# Patient Record
Sex: MALE | URBAN - METROPOLITAN AREA
[De-identification: names, ages, dates, MRNs, and addresses within clinical notes are randomized per-mention and may not be internally consistent; named-entity substitution may affect disease eponyms.]

---

## 2021-01-01 ENCOUNTER — EMERGENCY (EMERGENCY)
Facility: HOSPITAL | Age: 36
LOS: 0 days | Discharge: HOME | End: 2021-01-01
Attending: EMERGENCY MEDICINE
Payer: SELF-PAY

## 2021-01-01 VITALS
RESPIRATION RATE: 19 BRPM | OXYGEN SATURATION: 99 % | DIASTOLIC BLOOD PRESSURE: 89 MMHG | SYSTOLIC BLOOD PRESSURE: 130 MMHG | HEART RATE: 90 BPM | TEMPERATURE: 98 F

## 2021-01-01 VITALS
DIASTOLIC BLOOD PRESSURE: 93 MMHG | OXYGEN SATURATION: 99 % | WEIGHT: 179.9 LBS | HEIGHT: 67 IN | TEMPERATURE: 98 F | HEART RATE: 93 BPM | SYSTOLIC BLOOD PRESSURE: 137 MMHG | RESPIRATION RATE: 18 BRPM

## 2021-01-01 DIAGNOSIS — R11.0 NAUSEA: ICD-10-CM

## 2021-01-01 DIAGNOSIS — K29.20 ALCOHOLIC GASTRITIS WITHOUT BLEEDING: ICD-10-CM

## 2021-01-01 DIAGNOSIS — R10.12 LEFT UPPER QUADRANT PAIN: ICD-10-CM

## 2021-01-01 DIAGNOSIS — F10.229 ALCOHOL DEPENDENCE WITH INTOXICATION, UNSPECIFIED: ICD-10-CM

## 2021-01-01 DIAGNOSIS — F10.929 ALCOHOL USE, UNSPECIFIED WITH INTOXICATION, UNSPECIFIED: ICD-10-CM

## 2021-01-01 PROCEDURE — 99283 EMERGENCY DEPT VISIT LOW MDM: CPT

## 2021-01-01 PROCEDURE — 99285 EMERGENCY DEPT VISIT HI MDM: CPT

## 2021-01-01 RX ORDER — FAMOTIDINE 10 MG/ML
40 INJECTION INTRAVENOUS ONCE
Refills: 0 | Status: COMPLETED | OUTPATIENT
Start: 2021-01-01 | End: 2021-01-01

## 2021-01-01 RX ORDER — ONDANSETRON 8 MG/1
4 TABLET, FILM COATED ORAL ONCE
Refills: 0 | Status: COMPLETED | OUTPATIENT
Start: 2021-01-01 | End: 2021-01-01

## 2021-01-01 RX ADMIN — ONDANSETRON 4 MILLIGRAM(S): 8 TABLET, FILM COATED ORAL at 15:26

## 2021-01-01 RX ADMIN — FAMOTIDINE 40 MILLIGRAM(S): 10 INJECTION INTRAVENOUS at 15:25

## 2021-01-01 NOTE — ED PROVIDER NOTE - CARE PLAN
Principal Discharge DX:	Alcohol intoxication in active alcoholic with complication  Secondary Diagnosis:	Alcoholic gastritis without bleeding, unspecified chronicity

## 2021-01-01 NOTE — ED ADULT NURSE NOTE - OBJECTIVE STATEMENT
pt biba for epigastric pain that began today. pt endorses nausea, denies vomiting. Endorses drinking alcohol today. Unable to determine how many drinks. Pt poor historian. Pw slurred speech, poor hygiene, AOB. As per MD, pt to be medicated and allow pt to sober up in ED. will ctm

## 2021-01-01 NOTE — ED PROVIDER NOTE - NSFOLLOWUPINSTRUCTIONS_ED_ALL_ED_FT
Stop abusing alcohol.      ED evaluation and management discussed with the patient and family (if available) in detail.  Close PMD follow up encouraged.  Strict ED return instructions discussed in detail and patient given the opportunity to ask any questions about their discharge diagnosis and instructions. Patient verbalized understanding.          Trastorno por consumo de bebidas alcohólicas    Alcohol Use Disorder      El trastorno por consumo de bebidas alcohólicas ocurre cuando el consumo de alcohol altera vital syd cotidiana. Las personas que padecen esta afección beben demasiado alcohol y no pueden controlar el consumo.    Aura trastorno puede causar problemas graves en la bobbi física. Puede afectar el cerebro, el corazón, el hígado, el páncreas, el sistema inmunitario, el estómago y los intestinos. El trastorno por consumo de bebidas alcohólicas puede aumentar vital riesgo de contraer ciertos tipos de cáncer y causar problemas con la bobbi mental, tales pari depresión, ansiedad, psicosis, delirios y demencia. Las personas que tienen aura trastorno corren el riesgo de lastimarse a ellas mismas o a otras personas.      ¿Cuáles son las causas?    Esta afección se debe al consumo excesivo de alcohol con el transcurso del tiempo. No es causado por consumir demasiado alcohol solo dontrell o dos veces. Algunas personas que sufren esta afección beben alcohol para enfrentarse a situaciones negativas de la syd o escapar de ellas. Otros beben para aliviar el dolor o los síntomas de dontrell enfermedad mental.      ¿Qué incrementa el riesgo?  Es más probable que desarrolle esta afección si:  •Tiene antecedentes familiares de trastorno por consumo de bebidas alcohólicas.      •Vital cultura alienta el consumo de alcohol al punto de la intoxicación o facilita el acceso al alcohol.      •Tuvo un trastorno emocional o de conducta en la infancia.      •Fue víctima de abuso.    •Es adolescente y:  •Tiene calificaciones bajas o dificultades en la escuela.      •Ave cuidadores no le hablan sobre negarse a cristóbal alcohol ni supervisan ave actividades.      •Es impulsivo o tiene problemas con el autocontrol.          ¿Cuáles son los signos o los síntomas?  Los síntomas de esta afección incluyen los siguientes:  •Beber más de lo que desea.      •Beber por más tiempo del que desea.      •Intentar varias veces beber menos o controlar el consumo de alcohol.      •Invertir mucho tiempo en conseguir alcohol, beber o recuperarse de arthur bebido.      •Sentir dontrell necesidad imperiosa de beber alcohol.      •Tener problemas en el trabajo, la escuela o el hogar debido al consumo de alcohol.      •Tener problemas en las relaciones debido al consumo de alcohol.      •Beber cuando es peligroso hacerlo, por ejemplo, antes de conducir.      •Continuar bebiendo incluso sabiendo que podría tener un problema físico o mental relacionado con el consumo de alcohol.      •Necesitar cada vez más alcohol para obtener el mismo efecto que desea de aura (generar tolerancia).    •Tener síntomas de abstinencia al dejar de beber. Entre los síntomas de abstinencia se incluyen los siguientes:  •Fatiga.      •Pesadillas.      •Dificultad para dormir.      •Depresión.      •Ansiedad.      •Fiebre.      •Convulsiones.      •Confusión grave.      •Alf o sentir cosas que no existen (alucinaciones).      •Temblores.      •Frecuencia cardíaca acelerada.      •Respiración rápida.      •Hipertensión arterial.        •Consumir para evitar los síntomas de abstinencia.        ¿Cómo se diagnostica?    Esta afección se diagnostica con dontrell evaluación. El médico puede comenzar la evaluación con john o cuatro preguntas sobre vital consumo de alcohol.    El médico podrá realizar un examen físico o análisis de laboratorio para determinar si tiene problemas físicos pari resultado del consumo de alcohol. Puede derivarlo a un profesional de bobbi mental para que realice dontrell evaluación.      ¿Cómo se trata?  Algunas personas con trastorno por consumo de bebidas alcohólicas pueden reducir el consumo a niveles de bajo riesgo. Otras necesitan dejar el alcohol por completo. Cuando sea necesario, puede recibir ayuda de profesionales de bobbi mental capacitados en el tratamiento del abuso de sustancias. El médico puede ayudarlo a determinar la gravedad de vital trastorno por consumo de bebidas alcohólicas y el tipo de tratamiento que necesita. Las formas de tratamiento disponibles son:  •Desintoxicación. La desintoxicación implica dejar de beber y cristóbal medicamentos recetados en el plazo de la primera semana para reducir los síntomas de la abstinencia. Aura tratamiento es importante para las personas que ya garcia tenido síntomas de abstinencia y para los que consumen en exceso, quienes probablemente sufran síntomas de abstinencia. La abstinencia puede ser peligrosa y, en casos graves, puede causar la muerte. La desintoxicación puede realizarse en el hogar, en un ámbito extrahospitalario, en un centro de atención primaria, en un hospital o en un centro de tratamiento para abuso de sustancias.    •Asesoramiento psicológico. Aura tratamiento también se conoce pari psicoterapia. La realizan terapeutas especializados en el tratamiento de pacientes que abusan de sustancias. Un terapeuta puede abordar los motivos por los que consume alcohol y sugerirle maneras de evitar que vuelva a beber o que tenga un problema con la bebida. Los objetivos de la psicoterapia son los siguientes:  •Encontrar actividades saludables y formas de afrontar el estrés.      •Identificar y evitar aquello que lo conduzca a beber alcohol.      •Aprender a controlar las ansias de beber.      •Medicamentos. Los medicamentos pueden ayudar a tratar el trastorno por consumo de bebidas alcohólicas porque:  •Controlan las ansias de beber.      •Reducen el sentimiento positivo que experimenta al beber alcohol.      •Causan dontrell reacción física desagradable cuando oliva alcohol (terapia de aversión).        •Grupos de apoyo. Los grupos de apoyo son dirigidos por personas que garcia superado vital problema con la bebida. [place] apoyo emocional, consejos y orientación.      Estas formas de tratamiento, generalmente, se combinan. Algunas personas con esta afección se benefician del tratamiento combinado que se ofrece en algunos centros de tratamiento especializados en el abuso de sustancias.      Siga estas indicaciones en vital casa:    •[place] los medicamentos de venta shruti y los recetados solamente pari se lo haya indicado el médico.      •Consulte al médico antes de empezar cualquier medicamento nuevo.      •Pida a familiares y amigos que no le ofrezcan alcohol.      •Evite situaciones en las que se sirva alcohol, incluidas las reuniones en las que otros estén bebiendo alcohol.      •Elabore un plan de acción si siente la tentación de beber alcohol.      •Busque pasatiempos o actividades que disfrute, marek que no incluyan el consumo de alcohol.      •Concurra a todas las visitas de control pari se lo haya indicado el médico. [place] es importante.        ¿Cómo se brooke?    •Si oliva, limite el consumo de alcohol a no más de 1 medida por día si es lizet y no está embarazada, y a 2 medidas si es hombre. Dontrell medida equivale a 12 oz (355 ml) de cerveza, 5 oz (148 ml) de vino o 1½ oz (44 ml) de bebidas alcohólicas de alex graduación.      •Si tiene dontrell afección de bobbi mental, debe buscar tratamiento y apoyo.      • No ofrezca alcohol a adolescentes.    •Si es adolescente:  •No vaishali alcohol.      •No tenga miedo de negarse si alguien le ofrece alcohol. Diga en voz alex por qué no quiere beber alcohol. Puede ser un modelo a seguir positivo para ave amigos y un buen ejemplo para las personas que lo rodean al no consumir alcohol.      •Si ave amigos beben alcohol, pase más tiempo con quienes no lo alethea. Velia nuevas amistades que no consuman alcohol.      •Busque maneras saludables de controlar el estrés y las emociones, velma pari meditar, respirar profundo, hacer actividad física, pasar tiempo en la naturaleza, escuchar música o hablar con un amigo o un familiar confiable.          Comuníquese con un médico si:    •No puede cristóbal los medicamentos pari se lo garcia indicado.      •Los síntomas empeoran.      •Vuelve a consumir alcohol (recaída) y ave síntomas empeoran.        Solicite ayuda de inmediato si:    •Tiene pensamientos acerca de lastimarse a usted mismo o a otras personas.    Si alguna vez siente que puede lastimarse a usted mismo o a los demás, o piensa en poner fin a vital syd, busque ayuda de inmediato. Puede dirigirse al servicio de urgencias más cercano o comunicarse con:   • Vital servicio de emergencias local (911 en los [place]).       • Dontrell línea de asistencia al suicida y atención en crisis, pari la Línea Nacional de Prevención del Suicidio (National Suicide Prevention Lifeline) al 1-380.665.5506. Está disponible las 24 horas del día.         Resumen    •El trastorno por consumo de bebidas alcohólicas ocurre cuando el consumo de alcohol altera vital syd cotidiana. Las personas que padecen esta afección beben demasiado alcohol y no pueden controlar el consumo.      •El tratamiento puede incluir desintoxicación, asesoramiento psicológico, medicamentos y grupos de apoyo.      •Pida a familiares y amigos que no le ofrezcan alcohol. Evite situaciones en las que se sirva alcohol.      •Busque ayuda de inmediato si tiene pensamientos acerca de lastimarse a usted mismo o a otras personas.      Esta información no tiene pari fin reemplazar el consejo del médico. Asegúrese de hacerle al médico cualquier pregunta que tenga.            Intoxicación alcohólica    Alcohol Intoxication    La intoxicación alcohólica ocurre cuando no puede pensar con claridad ni desempeñarse whitney (tiene dontrell alteración) después de consumir bebidas alcohólicas. [place] puede ocurrir después de solo dontrell copa. El efecto que el alcohol tiene en vital manera de pensar y actuar depende de:  •La cantidad de alcohol que oliva.      •La edad, el peso y si es hombre o lizet.      •La frecuencia con la que consume alcohol.      •Si tiene otros problemas médicos.    La intoxicación alcohólica puede variar de leve a muy grave. Puede ser peligrosa, especialmente si:•Oliva dontrell gran cantidad de alcohol en un corto periodo de tiempo (consume alcohol compulsivamente).  •En las mujeres, el consumo de alcohol compulsivo significa cristóbal cuatro o más medidas de alcohol a la vez.      •En los hombres, el consumo de alcohol compulsivo significa beber latha o más medidas de alcohol a la vez.        •Joyce ciertos fármacos o medicamentos.    Si usted o alguien a vital alrededor está embriagado:  •Avísele a alguien.      •Obtenga ayuda.        Siga estas indicaciones en vital casa:      Comida y bebida    •Pregúntele a vital médico si el alcohol es seguro para usted.•Si vital médico le dice que beber alcohol es seguro para usted, limite la cantidad que consume a no más de 1 medida por día si es lizet y no está embarazada, y 2 medidas por día si es hombre. Dontrell medida equivale a lo siguiente:  •12 onzas (355 ml) de cerveza.      •5 onzas (150 ml) de vino.      •1,5 onzas (45 ml) de bebidas alcohólicas de alex graduación.      •No vaishali alcohol si:  •El médico le indica que no lo velia.      •Está embarazada, puede estar embarazada o está tratando de quedar embarazada.      •No tiene la edad legal para beber (mayor de 21 años de edad en los [place]).      •Está tomando medicamentos que no se deben mezclar con alcohol.      •El alcohol hace que vital problema médico empeore.      •Tiene que conducir o realizar actividades que requieren que esté alerta.      •Tiene un trastorno por abuso de sustancias. [place] se produce cuando el consumo repetido de alcohol le causa problemas en vital bobbi, ave relaciones o con ave obligaciones en el trabajo, el hogar o la escuela.        •Asegúrese de comer antes de beber alcohol. Evite beber cuando tiene el estómago vacío.    •Asegúrese de tener suficiente líquido en el organismo (mantenerse hidratado). Para hacer esto:  •Vaishali suficiente líquido para mantener la orina de color amarillo pálido.      •Evite la cafeína que el café, el té y algunos refrescos pueden contener. La cafeína puede hacerlo sentir sed.        •Intente no beber más de dontrell medida de alcohol por hora.      •Si va a beber más de dontrell medida de alcohol, tome dontrell bebida sin alcohol (pari agua) entre las bebidas alcohólicas.          Indicaciones generales      •[place] los medicamentos de venta shruti y los recetados solamente pari se lo haya indicado el médico.      • No conduzca después de beber cualquier cantidad de alcohol. Organice un conductor designado u otra forma de volver a casa.      •Pídale a alguna persona de confianza que se quede con usted mientras está embriagado. No debería quedarse solo.      •Concurra a todas las visitas de seguimiento pari se lo haya indicado el médico. [place] es importante.        Comuníquese con un médico si:    •No mejora luego de algunos días.      •Tiene problemas en el trabajo, la escuela o el hogar debido al consumo de alcohol.        Solicite ayuda de inmediato si:  •Tiene alguno de los siguientes síntomas:•Problemas moderados o muy intensos con:  •Los movimientos (coordinación).      •Hablar.      •La memoria.      •Prestar atención a las cosas.        •Dificultad para mantenerse despierto.      •Mucha confusión.      •Movimientos espasmódicos que no puede controlar (convulsiones).      •Desvanecimiento.      •Desmayos.      •Vómitos con fabricio. La fabricio puede ser de color mcdonnell brillante o similar al sedimento del café.    •Fabricio en las heces. La fabricio:  •Puede ser de color mcdonnell brillante.      •Puede hacer que las heces celio negras y alquitranadas, y que huelan mal.        •Se siente inestable cuando trata de dejar de beber.      •Pensamientos acerca de lastimarse a sí mismo o a otras personas.      Si alguna vez siente que puede lastimarse a usted mismo o a otras personas, o tiene pensamientos de poner fin a vital syd, busque ayuda de inmediato. Puede dirigirse al servicio de emergencias más cercano o comunicarse con:   • El servicio de emergencias de vital localidad (911 en [place]       • Dontrell línea de asistencia al suicida y atención en crisis, pari National Suicide Prevention Lifeline (Línea Nacional de Prevención del Suicidio), al 5-323-702-3109. Está disponible las 24 horas del día.         Resumen    •La intoxicación alcohólica ocurre cuando no puede pensar con claridad ni desempeñarse whitney (tiene dontrell alteración) después de consumir bebidas alcohólicas. [place] puede ocurrir después de solo dontrell copa.      •Si vital médico le dice que beber alcohol es seguro para usted, limite la cantidad que consume a no más de 1 medida por día si es lizet y no está embarazada, y 2 medidas por día si es hombre.      •Comuníquese con un médico si tiene problemas en el trabajo, la escuela o el hogar debido al consumo de alcohol.      •Busque ayuda de inmediato si tiene pensamientos acerca de lastimarse a usted mismo o a otras personas.      Esta información no tiene pari fin reemplazar el consejo del médico. Asegúrese de hacerle al médico cualquier pregunta que tenga.

## 2021-01-01 NOTE — ED PROVIDER NOTE - PROGRESS NOTE DETAILS
Dr. Mcmillan:  Informed by ED RN that pt now alert, self-ambulatory with steady gait, no active c/o's, + tolerated po well.  Pt stable for D/c at this time.

## 2021-01-01 NOTE — ED PROVIDER NOTE - OBJECTIVE STATEMENT
37 y/o male with no significant PMHx [place] the [place]. As per EMS, [place] train [place], saw EMS and stated "I want to go to the hospital." Pt c/o LUQ discomfort. Unable to obtain complete hx due to intoxication. 35 y/o male with no significant PMHx presents to the ED [place] for EtOH intoxication. As per EMS, pt was sitting [place] the train station, saw EMS and stated "I want to go to the hospital." Pt c/o LUQ discomfort. +nausea. Denies vomiting, CP, SOB, fevers, chills. Unable to obtain complete hx due to intoxication.

## 2021-01-01 NOTE — ED PROVIDER NOTE - ENMT, MLM
Oropharynx clear. Airway patent, Nasal mucosa clear. Mouth with normal mucosa. Throat has no vesicles, no oropharyngeal exudates and uvula is midline.

## 2021-01-01 NOTE — ED PROVIDER NOTE - CLINICAL SUMMARY MEDICAL DECISION MAKING FREE TEXT BOX
37 y/o  male [place] c/o LUQ discomfort in relation to EtOH abuse. +nausea. Denies vomiting, CP, SOB, fevers, chills. No recent trauma. Abd benign. Plan: Zofran ODT sublingual x1, Pepcid PO, observe and reassess.

## 2021-01-01 NOTE — ED ADULT NURSE NOTE - NSIMPLEMENTINTERV_GEN_ALL_ED
Implemented All Fall Risk Interventions:  [place] to call system. Call bell, personal items and telephone within reach. Instruct patient to call for assistance. Room bathroom lighting operational. Non-slip footwear when patient is off stretcher. Physically safe environment: no spills, clutter or unnecessary equipment. Stretcher in lowest position, wheels locked, appropriate side rails in place. Provide visual cue, wrist band, yellow gown, etc. Monitor gait and stability. Monitor for mental status changes and reorient to person, place, and time. Review medications for side effects contributing to fall risk. Reinforce activity limits and safety measures with patient and family.

## 2021-01-01 NOTE — ED ADULT NURSE REASSESSMENT NOTE - NS ED NURSE REASSESS COMMENT FT1
pt ambulatory, given dc instructions. pt verbalized understanding of dc. Aox4, denies complaints. given water in waiting room at pt's request. in no acute distress at time of dc.

## 2021-01-01 NOTE — ED PROVIDER NOTE - PATIENT PORTAL LINK FT
You can access the FollowMyHealth Patient Portal offered by [facility] by registering at the following website: [email or web address]. By joining [employer]’s FollowMyHealth portal, you will also be able to view your health information using other applications (apps) compatible with our system.

## 2021-01-01 NOTE — ED PROVIDER NOTE - MUSCULOSKELETAL, MLM
Spine appears normal, range of motion is not limited, no muscle or joint tenderness. ENGLE x4 with purposeful movement. No focal swelling tenderness or deformity.

## 2021-01-01 NOTE — ED ADULT TRIAGE NOTE - CHIEF COMPLAINT QUOTE
Patient [place] ED [place]oh [place]. [place] train station, saw EMS and stated , " I want to go to hospital". denies any injury or trauma. denies any pain or discomfort
